# Patient Record
(demographics unavailable — no encounter records)

---

## 2025-02-07 NOTE — PHYSICAL EXAM
[Normal Breath Sounds] : Normal breath sounds [2+] : left 2+ [Ankle Swelling (On Exam)] : present [Ankle Swelling Bilaterally] : bilaterally  [Varicose Veins Of Lower Extremities] : bilaterally [Ankle Swelling On The Right] : mild [] : bilaterally [Alert] : alert [Oriented to Person] : oriented to person [Oriented to Place] : oriented to place [Oriented to Time] : oriented to time [Calm] : calm [Skin Ulcer] : no ulcer [de-identified] : Appears well [de-identified] : BLLE swelling, equal bilateral. +scattered varicose and spider veins. No evidence of skin damage, arterial/venous ulcers.

## 2025-02-07 NOTE — HISTORY OF PRESENT ILLNESS
[FreeTextEntry1] : 64yo female accompanied by her  presents today as a new patient for abnormal arterial testing conducted by her insurance and PCP.  Pt states she switched insurances and had a representative come to her house to best her arterial circulation.  She was told her LLE circulation was worse than her right leg and she needed to f/u with her PCP.  Her PCP conducted an arterial test that showed she had poor circulation via her peroneal artery, and she needed to f/u with vascular for further evaluation.  Pt has no symptoms. She denies any claudication, ischemic rest pain.  She does complain of mind swelling of her lower extremities she sees more often in the summertime but nothing lifestyle limiting.

## 2025-02-07 NOTE — PHYSICAL EXAM
[Normal Breath Sounds] : Normal breath sounds [2+] : left 2+ [Ankle Swelling (On Exam)] : present [Ankle Swelling Bilaterally] : bilaterally  [Varicose Veins Of Lower Extremities] : bilaterally [Ankle Swelling On The Right] : mild [] : bilaterally [Alert] : alert [Oriented to Person] : oriented to person [Oriented to Place] : oriented to place [Oriented to Time] : oriented to time [Calm] : calm [Skin Ulcer] : no ulcer [de-identified] : Appears well [de-identified] : BLLE swelling, equal bilateral. +scattered varicose and spider veins. No evidence of skin damage, arterial/venous ulcers.

## 2025-02-07 NOTE — ASSESSMENT
[FreeTextEntry1] : 66yo female who presents today for further evaluation of her arterial circulation. MADI/PVR in office today show no significant arterial disease.  No further intervention indicated.   As to her BLLE swelling, pt explained her swelling maybe secondary to CVI but patient is asymptomatic. Recommend compression stockings 20-30 mmHg daily from awakening until bedtime, leg elevation above the level of the heart at rest, frequent ambulation and walk daily for exercise. Advised patient to follow up if she develops worsening symptoms including LE pain, swelling or worsening varicosities.

## 2025-02-07 NOTE — ASSESSMENT
[FreeTextEntry1] : 64yo female who presents today for further evaluation of her arterial circulation. MADI/PVR in office today show no significant arterial disease.  No further intervention indicated.   As to her BLLE swelling, pt explained her swelling maybe secondary to CVI but patient is asymptomatic. Recommend compression stockings 20-30 mmHg daily from awakening until bedtime, leg elevation above the level of the heart at rest, frequent ambulation and walk daily for exercise. Advised patient to follow up if she develops worsening symptoms including LE pain, swelling or worsening varicosities.